# Patient Record
Sex: MALE | Race: BLACK OR AFRICAN AMERICAN
[De-identification: names, ages, dates, MRNs, and addresses within clinical notes are randomized per-mention and may not be internally consistent; named-entity substitution may affect disease eponyms.]

---

## 2022-09-22 PROBLEM — Z00.00 ENCOUNTER FOR PREVENTIVE HEALTH EXAMINATION: Status: ACTIVE | Noted: 2022-09-22

## 2022-09-27 ENCOUNTER — APPOINTMENT (OUTPATIENT)
Dept: PHYSICAL MEDICINE AND REHAB | Facility: CLINIC | Age: 52
End: 2022-09-27

## 2022-09-27 VITALS
BODY MASS INDEX: 23.3 KG/M2 | HEART RATE: 98 BPM | OXYGEN SATURATION: 98 % | SYSTOLIC BLOOD PRESSURE: 163 MMHG | DIASTOLIC BLOOD PRESSURE: 101 MMHG | WEIGHT: 145 LBS | HEIGHT: 66 IN

## 2022-09-27 VITALS — HEART RATE: 86 BPM | OXYGEN SATURATION: 99 % | SYSTOLIC BLOOD PRESSURE: 165 MMHG | DIASTOLIC BLOOD PRESSURE: 104 MMHG

## 2022-09-27 DIAGNOSIS — I99.8 OTHER DISORDER OF CIRCULATORY SYSTEM: ICD-10-CM

## 2022-09-27 DIAGNOSIS — I82.409 ACUTE EMBOLISM AND THROMBOSIS OF UNSPECIFIED DEEP VEINS OF UNSPECIFIED LOWER EXTREMITY: ICD-10-CM

## 2022-09-27 PROCEDURE — 99205 OFFICE O/P NEW HI 60 MIN: CPT

## 2022-10-03 NOTE — HISTORY OF PRESENT ILLNESS
[FreeTextEntry1] : Luis Taylor M.D.\par Sports Medicine and Interventional Spine\par Department of Physical Medicine and Rehabilitation \par [facility] \par Email: [email or web address] <[email or web address] \par [facility]\par [facility]\par [address]\par [address], [address]th Floor\[place] [address]\par \par [facility]\par [facility]\par [address], 4th Floor\[place] [address]\par \par [facility] \par [facility]\par [address], 6th Floor\[place] [address]\par \par [facility] at [facility]\par [address] [facility] [facility]\[place] [address]\par \par [facility] \par [facility]\par [address] [facility]\[place] [address]\par \par For [place] Appointments\par Phone: [phone]\par Fax: [phone]\par \par For [place] Appointments\par Phone: [phone]\par Fax: [phone]\par \par \par ----------------------------------------------------------------------------------------------------------------------------------------\par \par PATIENT: MAGDY GARCES \par MRN: [id number] \par YOB: 1970 \par DATE OF VISIT: 09/27/2022 \par Referred by Unable to Collect PCP\par PCP ADDRESS:\par \par Sep 27, 2022 \par \par \par Dear Dr. DUMONT,REFERRED \par \par Thank you for referring MAGDY GARCES to my Sports and Interventional Spine practice and office. Enclosed is a copy of the patient's consultation/progress note, which includes my complete assessment and recent studies completed during the patient's evaluation.\par \par If you have questions or have any patients who require nonsurgical, non-opiate management of any sports, spine, or musculoskeletal conditions, please do not hesitate to contact my , Ivet Sibley at [phone].\par \par I look forward to taking care of your patients along with you.\par \par Sincerely,\par \par Luis\par \par Luis Taylor MD\par Sports, Interventional Spine, & Regenerative Musculoskeletal Medicine\par [facility]\par Email: [email or web address]\par \par \par                                                   Initial Consultation:\par CC: pain in the left leg\par \par HPI:  This is the first visit to [facility] Interventional Spine Practice.  \par \par MAGDY GARCES presents with the chief complaint as above.  \par \par Initial Hx on 09/27/2022 :\par Presents in person to [facility]\par patient reports left lower limb pain below the knee\par started over the past year\par cannot walk more than 1/2 block without left posterior leg pain\par patient has no other h/o of lower limb pain or circulatory compromise\par \par The patient’s difficulties began 2021\par The pain is graded as 2/10\par The pain is described as tightness, throbbing\par The pain is intermittent\par The pain does not radiate\par The pain radiates in the left LOWER limbs in a S1 distribution\par The patient feels that the pain is overall persistent\par Patient denies other recent fall, MVA, injury, trauma, or accident besides presenting history above\par \par Aggravating: ambulation, prolonged standing, navigating stairs,\par Alleviating: rest, activity modification, avoiding prolonged walking, pharmacologic treatments\par \par Meds: denies regular PO pain medications\par Therapy Program: no recent structured targeted therapy program\par HEP: doing HEP regularly\par \par Assoc Sx:\par Reports intermittent numbness, tingling paresthesia in the UPPER***LOWER limbs in a *** distribution\par Otherwise denies numbness, Tingling\par \par Denies Focal motor weakness in the upper or lower limbs\par Denies New or worsened bowel or bladder incontinence\par Denies Saddle anesthesia\par Denies Buckling\par Denies Using Orthotic(s)/Supportive devices\par Denies Swelling in the upper/lower extremities\par Denies Clicking\par They also deny frequent tripping, falling\par \par ROS: A 14 point review of systems was completed. Positive findings are pain as described above. The remaining systems negative.\par \par Prostate Hx: up to date\par COVID HX: reviewed\par \par Assoc Hx:\par Ambulates without assistive device\par Injection Hx: denies locally directed treatment to the area in question\par Imaging Hx: reviewed\par \par Level of functioning: indep with ambulation, indep with ADLs\par Living Situation: dwelling with steps to enter

## 2022-10-03 NOTE — PHYSICAL EXAM
[FreeTextEntry1] : Gen: A+O x 3 in NAD\par Psych: Normal mood and affect. Responds appropriately to commands\par Eyes: Anicteric. No discharge. EOMI.\par Resp: Breathing unlabored\par CV: DP pulses 2+ and equal. No varicosities noted\par Ext: No c/c/e\par Skin: No lesions noted\par \par Gait:\par Non antalgic \par +  reciprocating heel to toe\par able to stand on toes and heels WITH hand holding\par Tandem gait intact WITH hand holding\par Poor single leg standing balance\par Unable to stand on either lower limb without LOB for 15 seconds\par Romberg negative\par \par Trendelenburg present with left stance leg\par \par Inspection: Spine alignment is midline. Global loss of muscle bulk of the BLE\par Palpation: There is + tenderness over the midline spinous processes, paravertebral muscles of the thoracolumbar region\par Lumbar ROM: Flexion, extension, side-bending, rotation, limited in most planes\par +pain with lateral flexion\par +pain with oblique extension\par +pain with lateral rotation \par \par Hip ROM: neg pain at terminal ROM bilaterally.\par FAIR, FABERE negative bilaterally.\par \par 	Hip Flex       Knee Ext      Ankle Dorsi           EHL        Ankle Plantar\par Right	5/5	        5/5	                 5/5	          5-/5	             5/5                           \par Left	5/5	        5/5	                 5/5	          4+/5	             4+/5                           \par \par Hip abduction R 4+/5 L +5-/5\par Hip adduction R 4+/5 L 5-/5\par Hip extension R 4+/5 L 5-/5\par Knee Flexion R 4+/5 L 5-/5\par \par Tone: Normal. No clonus.\par Sensation: Grossly intact to light touch bilateral lower limbs.\par Proprioception: Intact at big toes bilaterally.\par Reflexes: 2+ symmetric knee jerk, ankle jerk. \par Plantars downgoing bilaterally.\par SLR negative bilaterally\par Crossed SLR negative bilaterally.\par Slump Test negative bilaterally\par + tinel's at the left fibular head\par neg sadiq of the LLE

## 2022-10-03 NOTE — ASSESSMENT
[FreeTextEntry1] :                                                       Assessment/Plan:\par \par MAGDY GARCES is a 52 year male with chronic low back pain here for initial consultation.\par \par 1. Lumbosacral spondylosis without myelopathy\par 2. Paresthesia of leg, left\par 3. Radiculopathy, lumbosacral, chronic, S1, left\par 4. Myofascial pain, lumbar\par 5. H/o DVT, left lower limb\par 6. Atrophy of the left lower limb (all muscle groups)\par 7. 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\par \par - Tiers of treatment and management of above diagnosis(es) were discussed with patient\par - Optimal diet, weight, sleep, and lifestyle management to minimize stress and maximize well being counseling provided\par - Imaging reviewed and discussed with patient\par - Patient was advised to start a structured, targeted therapy program 2-3x/wk for 8 wks with goal toward HEP\par - Patient was educated on an appropriate home exercise program, provided with exercise recommendations, all questions answered\par - Patient may trial acetaminophen 1000mg up to TID PRN moderate to severe pain and to decrease average consumption of NSAIDs\par - Patient was advised to apply cool compresses or warm heat to affected regions PRN\par - Ordered duplex BLE to r/o DVT currently in situ\par - Patient advised to follow up with Vascular Surgery, referral placed\par - Patient with chronic LLE pain, neuropathic type pain in the LLE as well concern for peripheral neuropathy vs lumbar radiculopathy\par \par - Educated about red flag symptoms including (but not limited to) new, worsened, or persistent: fever greater than 100F, bowel or bladder incontinence, bowel obstipation, inability to void urine, urinary leakage, Severe nausea or vomiting, Worsening numbness, worsening tingling/paresthesias, and/or new or progressive motor weakness; advised to seek immediate medical attention at his nearest Emergency department should they experience any of the above\par \par - Follow up in 2-3 weeks after imaging, in person in 2 months to assess their progress\par \par I have personally spent a total of at least 30 minutes preparing, reviewing internal and external records, explaining, counseling, and coordinating care for this patient encounter.\par \par Thank you, , for allowing me to participate in the care of your patient. Please do not hesitate to contact me with questions/concerns.\par \par Luis Taylor M.D.\par Sports and Interventional Spine\par Department of Physical Medicine and Rehabilitation \par [facility] \par Email: [email or web address] \par [facility]\par Orthopaedic [facility]\par [address]\par [facility], 11th Floor\[place] [address]\par \par Appointments: [phone]\par Fax: [phone]\par

## 2022-10-04 ENCOUNTER — APPOINTMENT (OUTPATIENT)
Dept: PHYSICAL MEDICINE AND REHAB | Facility: CLINIC | Age: 52
End: 2022-10-04

## 2022-10-04 VITALS
DIASTOLIC BLOOD PRESSURE: 72 MMHG | SYSTOLIC BLOOD PRESSURE: 113 MMHG | HEIGHT: 66 IN | RESPIRATION RATE: 18 BRPM | BODY MASS INDEX: 23.3 KG/M2 | WEIGHT: 145 LBS | HEART RATE: 98 BPM

## 2022-10-04 DIAGNOSIS — R56.9 UNSPECIFIED CONVULSIONS: ICD-10-CM

## 2022-10-04 DIAGNOSIS — Z78.9 OTHER SPECIFIED HEALTH STATUS: ICD-10-CM

## 2022-10-04 DIAGNOSIS — Z87.09 PERSONAL HISTORY OF OTHER DISEASES OF THE RESPIRATORY SYSTEM: ICD-10-CM

## 2022-10-04 DIAGNOSIS — M25.69 STIFFNESS OF OTHER SPECIFIED JOINT, NOT ELSEWHERE CLASSIFIED: ICD-10-CM

## 2022-10-04 DIAGNOSIS — Z82.61 FAMILY HISTORY OF ARTHRITIS: ICD-10-CM

## 2022-10-04 DIAGNOSIS — Z87.898 PERSONAL HISTORY OF OTHER SPECIFIED CONDITIONS: ICD-10-CM

## 2022-10-04 DIAGNOSIS — R52 PAIN, UNSPECIFIED: ICD-10-CM

## 2022-10-04 DIAGNOSIS — Z86.79 PERSONAL HISTORY OF OTHER DISEASES OF THE CIRCULATORY SYSTEM: ICD-10-CM

## 2022-10-04 DIAGNOSIS — G95.19 OTHER VASCULAR MYELOPATHIES: ICD-10-CM

## 2022-10-04 DIAGNOSIS — F17.200 NICOTINE DEPENDENCE, UNSPECIFIED, UNCOMPLICATED: ICD-10-CM

## 2022-10-04 DIAGNOSIS — J45.21 MILD INTERMITTENT ASTHMA WITH (ACUTE) EXACERBATION: ICD-10-CM

## 2022-10-04 PROCEDURE — 99214 OFFICE O/P EST MOD 30 MIN: CPT

## 2022-10-04 RX ORDER — CARBAMAZEPINE 300 MG/1
300 CAPSULE, EXTENDED RELEASE ORAL
Refills: 0 | Status: ACTIVE | COMMUNITY

## 2022-10-04 NOTE — HISTORY OF PRESENT ILLNESS
[FreeTextEntry1] : Mr. MAGDY GARCES is a very pleasant 52 year male who seen for evaluation of left leg pains  that has been ongoing for 7 months   without any specific injury or inciting event. The pain is located primarily post leg vahid calf  intermittent in nature and described as sharp with tingling  . The pain is rated as 4/10 during today's visit, and ranges from 3-9/10. The patient's symptoms are aggravated by any walking -has to stop after 2 blocks  and alleviated by rest  . The patient denies any night pain,  R leg numbness/tingling, weakness, or bowel/bladder dysfunction. The patient has no other complaints at this time.\par he is on SSD for mental condition bipolar\par he saw Dr LEYVA duplex scan LE organized \par he has had a DVT in past and ref to vasc sx \par he is a smoker \par

## 2022-10-04 NOTE — REVIEW OF SYSTEMS
[Fever] : no fever [Recent Change In Weight] : ~T no recent weight change [Leg Claudication] : intermittent leg claudication [Lower Ext Edema] : no lower extremity edema [Skin Wound] : no skin wound [Difficulty Walking] : difficulty walking

## 2022-10-04 NOTE — PHYSICAL EXAM
[FreeTextEntry1] : PHYSICAL EXAM : OBJECTIVE \par \par GENERAL : Awake ,alert and oriented to time place and person \par HEAD : normocephalic and atraumatic \par NECK : supple ,no tracheal deviation ,no thyroid enlargement noted with swallowing\par EYES : sclera and conjunctiva normal no redness,intact extraocular movements \par ENT  : ears and nose normal in appearance -hearing adequate \par PULMONARY: effort normal. No respiratory distress. breathing regular. No wheezes \par LYMPH : No swelling in limbs, capillary return within normal range \par CVS : warm extremities,no palpitations,not short of breath, no visible jugular venous distention\par PSYCH : mood and affect normal ,good eye contact ,normal attention \par ABDOMEN : no visible distension , \par NEUROLOGICAL:cranial nerves intact,muscle tone normal,gait and balance safe except where noted below \par SKIN : warm and dry No rash detected over specific body areas examined \par MUSCULOSKELETAL: normal muscle bulk, no focal bony tenderness /posture normal except where specified below\par hip ROM full \par spine FF 40 side bending painful \par SLR neg \par No long tract signs found on clinical exam and no focal neurological deficits\par no foot drop \par warm feet perfused pulses ok \par

## 2022-10-04 NOTE — REASON FOR VISIT
[Initial Evaluation] : an initial evaluation [FreeTextEntry1] : new to me but referred from fellow physiatrist

## 2022-10-17 ENCOUNTER — APPOINTMENT (OUTPATIENT)
Dept: PHYSICAL MEDICINE AND REHAB | Facility: CLINIC | Age: 52
End: 2022-10-17

## 2022-11-02 ENCOUNTER — APPOINTMENT (OUTPATIENT)
Dept: PHYSICAL MEDICINE AND REHAB | Facility: CLINIC | Age: 52
End: 2022-11-02

## 2022-11-02 DIAGNOSIS — M47.817 SPONDYLOSIS W/OUT MYELOPATHY OR RADICULOPATHY, LUMBOSACRAL REGION: ICD-10-CM

## 2022-11-02 DIAGNOSIS — I73.9 PERIPHERAL VASCULAR DISEASE, UNSPECIFIED: ICD-10-CM

## 2022-11-02 DIAGNOSIS — R20.2 PARESTHESIA OF SKIN: ICD-10-CM

## 2022-11-02 DIAGNOSIS — M79.18 MYALGIA, OTHER SITE: ICD-10-CM

## 2022-11-02 PROCEDURE — 95911 NRV CNDJ TEST 9-10 STUDIES: CPT

## 2022-11-02 PROCEDURE — 95886 MUSC TEST DONE W/N TEST COMP: CPT

## 2022-11-08 ENCOUNTER — APPOINTMENT (OUTPATIENT)
Dept: PHYSICAL MEDICINE AND REHAB | Facility: CLINIC | Age: 52
End: 2022-11-08